# Patient Record
(demographics unavailable — no encounter records)

---

## 2024-12-09 NOTE — ASSESSMENT
[FreeTextEntry1] : 74 yo female with a right ankle ulceration.  She underwent a right venous duplex which demonstrated venous insufficiency of the right SSV.   She is an excellent candidate for an ablation of the right  small saphenous vein. Risks and benefits were discussed with patients including infection, bleeding, and DVT. The patient would like to proceed with the procedure.  She was instructed to apply Bacitracin to the wound daily. She was instructed to wear either an ace wrap daily or a moderate knee high compression stocking. She will follow up in 2 weeks for wound check.

## 2024-12-09 NOTE — PHYSICAL EXAM
[Normal Breath Sounds] : Normal breath sounds [Alert] : alert [Oriented to Person] : oriented to person [Oriented to Place] : oriented to place [Oriented to Time] : oriented to time [JVD] : no jugular venous distention  [2+] : right 2+ [Ankle Swelling (On Exam)] : present [Varicose Veins Of Lower Extremities] : present [Varicose Veins Of The Right Leg] : of the right leg [Ankle Swelling On The Left] : moderate [] : of the right leg [Ankle Swelling On The Right] : mild [de-identified] : Awake and alert [de-identified] : medial malleolus ulcer [de-identified] : Appropriate

## 2024-12-09 NOTE — END OF VISIT
[FreeTextEntry3] :  All medical record entries made by the kayibe were at my, SALOME SERVIN, direction and personally dictated by me on 12/9/2024. I have reviewed the chart and agree that the record accurately reflects my personal performance of the history, physical exam, assessment, and plan. I have also personally directed, reviewed, and agreed with the chart.

## 2024-12-09 NOTE — REVIEW OF SYSTEMS
[Skin Wound] : skin wound [Fever] : no fever [Chills] : no chills [Leg Claudication] : no intermittent leg claudication [Limb Pain] : limb pain [Limb Swelling] : limb swelling

## 2024-12-09 NOTE — DATA REVIEWED
[FreeTextEntry1] : RLE venous duplex - personally reviewed - no DVT or SVT, venous insufficiency of the SSV

## 2024-12-09 NOTE — HISTORY OF PRESENT ILLNESS
[FreeTextEntry1] : 71 yo female presents for an initial evaluation of bilateral lower extremity symptomatic varicose veins.  She reports  a recent episode of bleeding from her left leg varicose veins . The patient currently does not wear compression stockings because she was afraid that she will cut herself when putting on the stockings causing recurrent bleeding. She denies a history of DVT or SVT. \par  \par  She is a former smoker but quit many years ago. \par  She currently takes aspirin 81 mg on a daily basis.\par   [de-identified] : 72 yo female returns for in follow up for a chief complaint of a right lower extremity venous ulcer. The patient reports that she developed an ulcer approximately 2 months ago. She has been treating the ulcer with Neosporin with some improvement. She has a history of varicose veins and venous insufficiency. She would like to discuss additional treatment options.

## 2024-12-23 NOTE — HISTORY OF PRESENT ILLNESS
[FreeTextEntry1] : 73 yo female presents for an initial evaluation of bilateral lower extremity symptomatic varicose veins.  She reports  a recent episode of bleeding from her left leg varicose veins . The patient currently does not wear compression stockings because she was afraid that she will cut herself when putting on the stockings causing recurrent bleeding. She denies a history of DVT or SVT. \par  \par  She is a former smoker but quit many years ago. \par  She currently takes aspirin 81 mg on a daily basis.\par   [de-identified] : 72 yo female returns for in follow up for a chief complaint of a right lower extremity venous ulcer. The patient reports that she developed an ulcer approximately 2 months ago. She has been treating the ulcer with bacitracin since her last appointment. She reports that there may be some small improvement. She is awaiting an ablation of her SSV.

## 2024-12-23 NOTE — PROCEDURE
[FreeTextEntry1] : A sharp excisional debridement of necrotic tissue was performed with a 15 blade. < 20 sq cm was debrided. The patient tolerated the procedure well.

## 2024-12-23 NOTE — REVIEW OF SYSTEMS
[Fever] : no fever [Chills] : no chills [Leg Claudication] : no intermittent leg claudication [Limb Pain] : limb pain [Limb Swelling] : limb swelling [Skin Wound] : skin wound

## 2024-12-23 NOTE — ASSESSMENT
[FreeTextEntry1] : 74 yo female with a right ankle ulceration.  She has venous insufficiency of the right SSV.   She is an excellent candidate for an ablation of the right  small saphenous vein. Risks and benefits were discussed with patients including infection, bleeding, and DVT. The patient would like to proceed with the procedure.  She was instructed to apply Santyl to the wound daily. She was instructed to wear either an ace wrap daily or a moderate knee high compression stocking. She will follow up in 2 weeks for wound check.

## 2024-12-23 NOTE — PHYSICAL EXAM
[JVD] : no jugular venous distention  [Normal Breath Sounds] : Normal breath sounds [2+] : right 2+ [Ankle Swelling (On Exam)] : present [Varicose Veins Of Lower Extremities] : present [Varicose Veins Of The Right Leg] : of the right leg [Ankle Swelling On The Left] : moderate [] : of the right leg [Ankle Swelling On The Right] : mild [Alert] : alert [Oriented to Person] : oriented to person [Oriented to Place] : oriented to place [Oriented to Time] : oriented to time [de-identified] : Awake and alert [de-identified] : medial malleolus ulcer with devitalized tissue [de-identified] : Appropriate

## 2024-12-23 NOTE — PHYSICAL EXAM
[JVD] : no jugular venous distention  [Normal Breath Sounds] : Normal breath sounds [2+] : right 2+ [Ankle Swelling (On Exam)] : present [Varicose Veins Of Lower Extremities] : present [Varicose Veins Of The Right Leg] : of the right leg [Ankle Swelling On The Left] : moderate [] : of the right leg [Ankle Swelling On The Right] : mild [Alert] : alert [Oriented to Person] : oriented to person [Oriented to Place] : oriented to place [Oriented to Time] : oriented to time [de-identified] : Awake and alert [de-identified] : medial malleolus ulcer with devitalized tissue [de-identified] : Appropriate

## 2024-12-23 NOTE — HISTORY OF PRESENT ILLNESS
[FreeTextEntry1] : 73 yo female presents for an initial evaluation of bilateral lower extremity symptomatic varicose veins.  She reports  a recent episode of bleeding from her left leg varicose veins . The patient currently does not wear compression stockings because she was afraid that she will cut herself when putting on the stockings causing recurrent bleeding. She denies a history of DVT or SVT. \par  \par  She is a former smoker but quit many years ago. \par  She currently takes aspirin 81 mg on a daily basis.\par   [de-identified] : 72 yo female returns for in follow up for a chief complaint of a right lower extremity venous ulcer. The patient reports that she developed an ulcer approximately 2 months ago. She has been treating the ulcer with bacitracin since her last appointment. She reports that there may be some small improvement. She is awaiting an ablation of her SSV.

## 2025-01-06 NOTE — PHYSICAL EXAM
[JVD] : no jugular venous distention  [Normal Breath Sounds] : Normal breath sounds [2+] : right 2+ [Ankle Swelling (On Exam)] : present [Varicose Veins Of Lower Extremities] : present [Varicose Veins Of The Right Leg] : of the right leg [Ankle Swelling On The Left] : moderate [] : of the right leg [Ankle Swelling On The Right] : mild [Alert] : alert [Oriented to Person] : oriented to person [Oriented to Place] : oriented to place [Oriented to Time] : oriented to time [de-identified] : Awake and alert [de-identified] : medial malleolus ulcer markedly improved - approximately 90% healed [de-identified] : Appropriate

## 2025-01-06 NOTE — REVIEW OF SYSTEMS
[Fever] : no fever [Chills] : no chills [Leg Claudication] : no intermittent leg claudication [Limb Pain] : no limb pain [Limb Swelling] : no limb swelling [Skin Wound] : skin wound

## 2025-01-06 NOTE — HISTORY OF PRESENT ILLNESS
[FreeTextEntry1] : 73 yo female presents for an initial evaluation of bilateral lower extremity symptomatic varicose veins.  She reports  a recent episode of bleeding from her left leg varicose veins . The patient currently does not wear compression stockings because she was afraid that she will cut herself when putting on the stockings causing recurrent bleeding. She denies a history of DVT or SVT. \par  \par  She is a former smoker but quit many years ago. \par  She currently takes aspirin 81 mg on a daily basis.\par   [de-identified] : 74 yo female returns for in follow up for a chief complaint of a right lower extremity venous ulcer. The patient reports that she developed an ulcer approximately 2 months ago. She has been treating the ulcer with bacitracin since her last appointment. Unfortunately, she did not start to use the Santyl as prescribed. She reports that the ulcer has improved since her last visit.  Her pain has improved. She continues to wear an ace wrap daily. She is awaiting an ablation of her SSV.

## 2025-01-06 NOTE — ASSESSMENT
[FreeTextEntry1] : 72 yo female with a right ankle ulceration. The ulcer has improved significantly since her last visit.   She has venous insufficiency of the right SSV.   She is an excellent candidate for an ablation of the right  small saphenous vein. Risks and benefits were discussed with patients including infection, bleeding, and DVT. The patient would like to proceed with the procedure.  She was instructed to apply Santyl to the wound daily. She was instructed to wear either an ace wrap daily or a moderate knee high compression stocking. She will follow up in 2 weeks for wound check.

## 2025-01-27 NOTE — PROCEDURE
[FreeTextEntry1] : A sharp excisional debridement was performed of the skin and subcutaneous tissue with a 15 blade,  < 20 sq cm was debrided. The patient tolerated the procedure well.

## 2025-01-27 NOTE — REVIEW OF SYSTEMS
[Skin Wound] : skin wound [Fever] : no fever [Chills] : no chills [Leg Claudication] : no intermittent leg claudication [Limb Pain] : no limb pain [Limb Swelling] : no limb swelling

## 2025-01-27 NOTE — ADDENDUM
HR=72 bpm, ZRGA=753/77 mmhg, SpO2=99.0 %, Resp=16 B/min, Pain=0, Devaughn=10, Bazan=2 [FreeTextEntry1] :  Documented by Adrianna Rosa acting as a scribe for SALOME SERVIN on 1/27/2025.

## 2025-01-27 NOTE — END OF VISIT
[FreeTextEntry3] :  All medical record entries made by the kayibe were at MALATHI moran KENNETH, direction and personally dictated by me on 1/27/2025. I have reviewed the chart and agree that the record accurately reflects my personal performance of the history, physical exam, assessment, and plan. I have also personally directed, reviewed, and agreed with the chart.

## 2025-01-27 NOTE — ASSESSMENT
[FreeTextEntry1] : 74 yo female with a right ankle ulceration. The ulcer  continues to improve. She underwent a sharp excisional debridement to clean granulating tissue. She tolerated the debridement.  She was instructed to apply Santyl to the wound daily. She was instructed to wear either an ace wrap daily or a moderate knee high compression stocking daily.  She has venous insufficiency of the right SSV.  Patient is scheduled for an ablation of the right small saphenous vein in 2 weeks.

## 2025-01-27 NOTE — HISTORY OF PRESENT ILLNESS
[FreeTextEntry1] : 73 yo female presents for an initial evaluation of bilateral lower extremity symptomatic varicose veins.  She reports  a recent episode of bleeding from her left leg varicose veins . The patient currently does not wear compression stockings because she was afraid that she will cut herself when putting on the stockings causing recurrent bleeding. She denies a history of DVT or SVT. \par  \par  She is a former smoker but quit many years ago. \par  She currently takes aspirin 81 mg on a daily basis.\par   [de-identified] : 72 yo female returns  in follow up for a chief complaint of a right lower extremity venous ulcer. The patient reports developed an ulcer approximately 2 months ago. She has been treating the ulcer with Santyl since her last appointment.  She been cleaning and washing the wound as well. She reports that the ulcer has improved since her last visit.  Her pain has improved. She continues to wear an ace wrap daily.  She presents for a wound check.

## 2025-01-27 NOTE — PHYSICAL EXAM
[Normal Breath Sounds] : Normal breath sounds [2+] : right 2+ [Ankle Swelling (On Exam)] : present [Varicose Veins Of Lower Extremities] : present [Varicose Veins Of The Right Leg] : of the right leg [Ankle Swelling On The Left] : moderate [] : of the right leg [Ankle Swelling On The Right] : mild [Alert] : alert [Oriented to Person] : oriented to person [Oriented to Place] : oriented to place [Oriented to Time] : oriented to time [JVD] : no jugular venous distention  [de-identified] : Awake and alert [de-identified] : medial malleolus ulcer debrided - approximately 90% healed [de-identified] : Appropriate

## 2025-02-10 NOTE — ADDENDUM
[FreeTextEntry1] : She tolerated the procedure. She verbalized understanding of the post procedure instructions.  She will follow up in 1 week.

## 2025-02-10 NOTE — PROCEDURE
[FreeTextEntry1] : RFA right SSV [FreeTextEntry2] : Venous insufficiency with a nonhealing ulcerations [FreeTextEntry3] : The patient was seen in the waiting room where the consent was obtained. She  was then taken to the procedure room where a timeout was called to verify her identity and the laterality of the procedure. The patient's right leg was then interrogated under ultrasound and an appropriate place for cannulation was identified. Her  right leg was then prepped and draped in the standard fashion. Under ultrasound guidance the patient was given an injection of 1% plain lidocaine in the distal calf. Access was then obtained with a 7 FR sheath in the standard fashion. Catheter was placed to the SPJ and withdrawn 3.0 cm from the junction. Patient was then given tumescence around the saphenous vein under ultrasound guidance. The ablation was then performed in the standard fashion. Steri strips were applied to catheter insertion site. Leg was wrapped in kerlix and an ace wrap. Patient was discharged in stable condition.

## 2025-02-25 NOTE — PHYSICAL EXAM
[Normal Breath Sounds] : Normal breath sounds [2+] : right 2+ [Ankle Swelling (On Exam)] : present [Varicose Veins Of Lower Extremities] : present [Varicose Veins Of The Right Leg] : of the right leg [Ankle Swelling On The Left] : moderate [] : of the right leg [Alert] : alert [Oriented to Person] : oriented to person [Oriented to Place] : oriented to place [Oriented to Time] : oriented to time [JVD] : no jugular venous distention  [Ankle Swelling On The Right] : mild [de-identified] : Awake and alert [de-identified] : medial malleolus ulcer - approximately 90% healed [de-identified] : Appropriate

## 2025-02-25 NOTE — END OF VISIT
[FreeTextEntry3] :  All medical record entries made by the kayibe were at MALATHI moran KENNETH, direction and personally dictated by me on 2/25/2025. I have reviewed the chart and agree that the record accurately reflects my personal performance of the history, physical exam, assessment, and plan. I have also personally directed, reviewed, and agreed with the chart.

## 2025-02-25 NOTE — PROCEDURE
[FreeTextEntry1] : A sharp excisional debridement of a right lower extremity wound was performed with a 15 blade. < 20 sq cm of devitalized tissue was excised. She tolerated the procedure well.

## 2025-02-25 NOTE — REVIEW OF SYSTEMS
[Skin Wound] : skin wound [Fever] : no fever [Chills] : no chills [Leg Claudication] : no intermittent leg claudication [Lower Ext Edema] : no lower extremity edema [Limb Pain] : no limb pain [Limb Swelling] : no limb swelling

## 2025-02-25 NOTE — HISTORY OF PRESENT ILLNESS
[FreeTextEntry1] : 71 yo female presents for an initial evaluation of bilateral lower extremity symptomatic varicose veins.  She reports  a recent episode of bleeding from her left leg varicose veins . The patient currently does not wear compression stockings because she was afraid that she will cut herself when putting on the stockings causing recurrent bleeding. She denies a history of DVT or SVT. \par  \par  She is a former smoker but quit many years ago. \par  She currently takes aspirin 81 mg on a daily basis.\par   [de-identified] : 72 yo female returns in follow up.  She is s/p an RFA of the right SSV a week ago for a nonhealing ulcer. She reports that the ulcer is not healed. She is no longer wearing an ace wrap. She continues to apply Santyl to the wound. She underwent a lower extremity venous duplex and is here to discuss the results and additional treatment options.

## 2025-02-25 NOTE — PHYSICAL EXAM
[Normal Breath Sounds] : Normal breath sounds [2+] : right 2+ [Ankle Swelling (On Exam)] : present [Varicose Veins Of Lower Extremities] : present [Varicose Veins Of The Right Leg] : of the right leg [Ankle Swelling On The Left] : moderate [] : of the right leg [Alert] : alert [Oriented to Person] : oriented to person [Oriented to Place] : oriented to place [Oriented to Time] : oriented to time [JVD] : no jugular venous distention  [Ankle Swelling On The Right] : mild [de-identified] : Awake and alert [de-identified] : medial malleolus ulcer - approximately 90% healed [de-identified] : Appropriate

## 2025-02-25 NOTE — DATA REVIEWED
[FreeTextEntry1] : Lower extremity venous duplex - personally reviewed - Right SSV ablated appropriately, no DVT

## 2025-02-25 NOTE — HISTORY OF PRESENT ILLNESS
[FreeTextEntry1] : 73 yo female presents for an initial evaluation of bilateral lower extremity symptomatic varicose veins.  She reports  a recent episode of bleeding from her left leg varicose veins . The patient currently does not wear compression stockings because she was afraid that she will cut herself when putting on the stockings causing recurrent bleeding. She denies a history of DVT or SVT. \par  \par  She is a former smoker but quit many years ago. \par  She currently takes aspirin 81 mg on a daily basis.\par   [de-identified] : 72 yo female returns in follow up.  She is s/p an RFA of the right SSV a week ago for a nonhealing ulcer. She reports that the ulcer is not healed. She is no longer wearing an ace wrap. She continues to apply Santyl to the wound. She underwent a lower extremity venous duplex and is here to discuss the results and additional treatment options.

## 2025-02-25 NOTE — ASSESSMENT
[FreeTextEntry1] : 72 yo female with a right ankle ulceration and venous insufficiency. She is status post RFA of the right SSV. She is doing well post- procedure. She underwent a lower extremity duplex which demonstrated no DVT and an appropriately ablated SSV. The ulcer is still not healed and she underwent a sharp excisional debridement in the office.  Santyl and an ace wrap were applied. She was instructed to apply Santyl to the wound daily.  I recommended that she wear either an ace wrap daily or a moderate knee high compression stocking daily.  She will follow up in 2 weeks for a wound check.

## 2025-03-10 NOTE — HISTORY OF PRESENT ILLNESS
[FreeTextEntry1] : 73 yo female presents for an initial evaluation of bilateral lower extremity symptomatic varicose veins.  She reports  a recent episode of bleeding from her left leg varicose veins . The patient currently does not wear compression stockings because she was afraid that she will cut herself when putting on the stockings causing recurrent bleeding. She denies a history of DVT or SVT. \par  \par  She is a former smoker but quit many years ago. \par  She currently takes aspirin 81 mg on a daily basis.\par   [de-identified] : 74 yo female returns in follow up.  She is s/p an RFA of the right SSV   for a nonhealing ulcer. She reports that the ulcer has improved since her last appointment.  She continues to apply Santyl to the wound. She has been wearing an ace wrap on a daily basis. She presents for a wound check.

## 2025-03-10 NOTE — REVIEW OF SYSTEMS
[Fever] : no fever [Chills] : no chills [Leg Claudication] : no intermittent leg claudication [Lower Ext Edema] : no lower extremity edema [Limb Pain] : no limb pain [Limb Swelling] : no limb swelling [Skin Wound] : skin wound

## 2025-03-10 NOTE — PHYSICAL EXAM
[JVD] : no jugular venous distention  [Normal Breath Sounds] : Normal breath sounds [2+] : right 2+ [Ankle Swelling (On Exam)] : not present [Varicose Veins Of Lower Extremities] : present [Varicose Veins Of The Right Leg] : of the right leg [Ankle Swelling On The Left] : moderate [] : of the right leg [Ankle Swelling On The Right] : mild [Alert] : alert [Oriented to Person] : oriented to person [Oriented to Place] : oriented to place [Oriented to Time] : oriented to time [de-identified] : Awake and alert [de-identified] : medial malleolus ulcer - approximately 90% healed [de-identified] : Appropriate

## 2025-03-10 NOTE — ASSESSMENT
[FreeTextEntry1] : 74 yo female with a right ankle ulceration and venous insufficiency. She is status post RFA of the right SSV. She is doing well post- procedure. The ulcer has decreased in size since her last appointment.  She underwent a sharp excisional debridement in the office.  Santyl and an ace wrap were applied. She was instructed to apply Santyl to the wound daily.  I recommended that she wear either an ace wrap daily or a moderate knee high compression stocking daily.  She will follow up in 3 weeks for a wound check. I am hopeful that the wound will be healed by her next appointment.

## 2025-03-31 NOTE — PHYSICAL EXAM
[Normal Breath Sounds] : Normal breath sounds [Varicose Veins Of Lower Extremities] : present [Varicose Veins Of The Right Leg] : of the right leg [Ankle Swelling On The Left] : moderate [] : of the right leg [Alert] : alert [Oriented to Person] : oriented to person [Oriented to Place] : oriented to place [Oriented to Time] : oriented to time [JVD] : no jugular venous distention  [Ankle Swelling (On Exam)] : present [Ankle Swelling On The Right] : mild [de-identified] : Awake and alert [de-identified] : Appropriate [de-identified] : medial malleolus ulcer - healed

## 2025-03-31 NOTE — HISTORY OF PRESENT ILLNESS
[FreeTextEntry1] : 71 yo female presents for an initial evaluation of bilateral lower extremity symptomatic varicose veins.  She reports  a recent episode of bleeding from her left leg varicose veins . The patient currently does not wear compression stockings because she was afraid that she will cut herself when putting on the stockings causing recurrent bleeding. She denies a history of DVT or SVT. \par  \par  She is a former smoker but quit many years ago. \par  She currently takes aspirin 81 mg on a daily basis.\par   [de-identified] : 75 yo female returns in follow up.  She is s/p an RFA of the right SSV  for a nonhealing ulcer. She reports that the ulcer has  healed since her last appointment.  She has dawit applying Aquaphor to the wound. She has no longer been wearing an ace wrap on a daily basis. She presents for a wound check.

## 2025-03-31 NOTE — ASSESSMENT
[FreeTextEntry1] : 73 yo female with a right ankle ulceration and venous insufficiency. She is status post RFA of the right SSV. She is doing well post- procedure. The ulcer is now healed.  Bacitracin and an ace wrap were applied. She was instructed to apply Aquaphor to the wound daily.  I recommended that she wear either an ace wrap daily or a moderate knee high compression stocking daily.  She will follow upon a PRN basis.